# Patient Record
Sex: MALE | Race: BLACK OR AFRICAN AMERICAN | NOT HISPANIC OR LATINO | Employment: FULL TIME | URBAN - METROPOLITAN AREA
[De-identification: names, ages, dates, MRNs, and addresses within clinical notes are randomized per-mention and may not be internally consistent; named-entity substitution may affect disease eponyms.]

---

## 2023-09-08 ENCOUNTER — HOSPITAL ENCOUNTER (OUTPATIENT)
Dept: DATA CONVERSION | Facility: HOSPITAL | Age: 63
Discharge: HOME | End: 2023-09-08
Payer: COMMERCIAL

## 2023-09-08 DIAGNOSIS — E03.9 HYPOTHYROIDISM, UNSPECIFIED: ICD-10-CM

## 2023-09-08 DIAGNOSIS — E78.5 HYPERLIPIDEMIA, UNSPECIFIED: ICD-10-CM

## 2023-09-08 DIAGNOSIS — R73.03 PREDIABETES: ICD-10-CM

## 2023-09-08 DIAGNOSIS — Z00.00 ENCOUNTER FOR GENERAL ADULT MEDICAL EXAMINATION WITHOUT ABNORMAL FINDINGS: ICD-10-CM

## 2023-09-08 DIAGNOSIS — I10 ESSENTIAL (PRIMARY) HYPERTENSION: ICD-10-CM

## 2023-09-08 LAB
ALBUMIN SERPL-MCNC: 3.7 GM/DL (ref 3.5–5)
ALBUMIN/GLOB SERPL: 0.9 RATIO (ref 1.5–3)
ALP BLD-CCNC: 107 U/L (ref 35–125)
ALT SERPL-CCNC: 24 U/L (ref 5–40)
ANION GAP SERPL CALCULATED.3IONS-SCNC: 11 MMOL/L (ref 0–19)
APPEARANCE PLAS: CLEAR
AST SERPL-CCNC: 22 U/L (ref 5–40)
BILIRUB SERPL-MCNC: 0.3 MG/DL (ref 0.1–1.2)
BUN SERPL-MCNC: 13 MG/DL (ref 8–25)
BUN/CREAT SERPL: 13 RATIO (ref 8–21)
CALCIUM SERPL-MCNC: 9.7 MG/DL (ref 8.5–10.4)
CHLORIDE SERPL-SCNC: 106 MMOL/L (ref 97–107)
CHOLEST SERPL-MCNC: 215 MG/DL (ref 133–200)
CHOLEST/HDLC SERPL: 3.4 RATIO
CO2 SERPL-SCNC: 22 MMOL/L (ref 24–31)
COLOR SPUN FLD: YELLOW
CREAT SERPL-MCNC: 1 MG/DL (ref 0.4–1.6)
FASTING STATUS PATIENT QL REPORTED: ABNORMAL
GFR SERPL CREATININE-BSD FRML MDRD: 85 ML/MIN/1.73 M2
GLOBULIN SER-MCNC: 3.9 G/DL (ref 1.9–3.7)
GLUCOSE SERPL-MCNC: 99 MG/DL (ref 65–99)
HBA1C MFR BLD: 5.9 % (ref 4–6)
HDLC SERPL-MCNC: 63 MG/DL
LDLC SERPL CALC-MCNC: 133 MG/DL (ref 65–130)
POTASSIUM SERPL-SCNC: 4.2 MMOL/L (ref 3.4–5.1)
PROT SERPL-MCNC: 7.6 G/DL (ref 5.9–7.9)
SODIUM SERPL-SCNC: 139 MMOL/L (ref 133–145)
TRIGL SERPL-MCNC: 94 MG/DL (ref 40–150)
TSH SERPL DL<=0.05 MIU/L-ACNC: 1.05 MIU/L (ref 0.27–4.2)

## 2023-09-11 PROBLEM — D70.8 OTHER NEUTROPENIA (CMS-HCC): Status: ACTIVE | Noted: 2023-09-11

## 2023-09-11 PROBLEM — E03.9 HYPOTHYROIDISM, ADULT: Status: ACTIVE | Noted: 2023-09-11

## 2023-09-11 PROBLEM — E78.5 HYPERLIPIDEMIA, UNSPECIFIED: Status: ACTIVE | Noted: 2023-09-11

## 2023-09-11 PROBLEM — G56.21 ULNAR NEUROPATHY OF RIGHT UPPER EXTREMITY: Status: ACTIVE | Noted: 2023-09-11

## 2023-09-11 PROBLEM — I10 ESSENTIAL (PRIMARY) HYPERTENSION: Status: ACTIVE | Noted: 2023-09-11

## 2023-09-11 PROBLEM — E55.9 VITAMIN D DEFICIENCY: Status: ACTIVE | Noted: 2023-09-11

## 2023-09-11 RX ORDER — AMLODIPINE BESYLATE 10 MG/1
1 TABLET ORAL DAILY
COMMUNITY
End: 2024-03-22

## 2023-09-11 RX ORDER — LEVOTHYROXINE SODIUM 100 UG/1
1 TABLET ORAL
COMMUNITY
End: 2024-04-12

## 2023-09-11 RX ORDER — CHOLECALCIFEROL (VITAMIN D3) 125 MCG
1 TABLET ORAL DAILY
COMMUNITY
End: 2024-01-17 | Stop reason: WASHOUT

## 2024-01-17 ENCOUNTER — OFFICE VISIT (OUTPATIENT)
Dept: PRIMARY CARE | Facility: CLINIC | Age: 64
End: 2024-01-17
Payer: COMMERCIAL

## 2024-01-17 VITALS
OXYGEN SATURATION: 96 % | TEMPERATURE: 97.2 F | SYSTOLIC BLOOD PRESSURE: 122 MMHG | HEIGHT: 70 IN | WEIGHT: 219.8 LBS | DIASTOLIC BLOOD PRESSURE: 72 MMHG | HEART RATE: 63 BPM | BODY MASS INDEX: 31.47 KG/M2

## 2024-01-17 DIAGNOSIS — M25.512 ACUTE PAIN OF LEFT SHOULDER: Primary | ICD-10-CM

## 2024-01-17 DIAGNOSIS — N52.8 OTHER MALE ERECTILE DYSFUNCTION: ICD-10-CM

## 2024-01-17 DIAGNOSIS — M24.849 LOCKING FINGER JOINT: ICD-10-CM

## 2024-01-17 DIAGNOSIS — E55.9 VITAMIN D DEFICIENCY: ICD-10-CM

## 2024-01-17 PROBLEM — R73.03 PREDIABETES: Status: ACTIVE | Noted: 2024-01-17

## 2024-01-17 PROCEDURE — 3078F DIAST BP <80 MM HG: CPT | Performed by: FAMILY MEDICINE

## 2024-01-17 PROCEDURE — 3074F SYST BP LT 130 MM HG: CPT | Performed by: FAMILY MEDICINE

## 2024-01-17 PROCEDURE — 99214 OFFICE O/P EST MOD 30 MIN: CPT | Performed by: FAMILY MEDICINE

## 2024-01-17 RX ORDER — METHYLPREDNISOLONE 4 MG/1
TABLET ORAL
Qty: 21 TABLET | Refills: 0 | Status: SHIPPED | OUTPATIENT
Start: 2024-01-17 | End: 2024-01-24

## 2024-01-17 RX ORDER — SILDENAFIL 100 MG/1
100 TABLET, FILM COATED ORAL DAILY PRN
Qty: 12 TABLET | Refills: 3 | Status: SHIPPED | OUTPATIENT
Start: 2024-01-17 | End: 2024-04-19 | Stop reason: SDUPTHER

## 2024-01-17 ASSESSMENT — PAIN SCALES - GENERAL: PAINLEVEL: 5

## 2024-01-17 ASSESSMENT — PATIENT HEALTH QUESTIONNAIRE - PHQ9
SUM OF ALL RESPONSES TO PHQ9 QUESTIONS 1 AND 2: 0
1. LITTLE INTEREST OR PLEASURE IN DOING THINGS: NOT AT ALL
2. FEELING DOWN, DEPRESSED OR HOPELESS: NOT AT ALL

## 2024-01-17 NOTE — PATIENT INSTRUCTIONS
Problem List Items Addressed This Visit             ICD-10-CM    Vitamin D deficiency E55.9     - At this time, I will we will plan to defer vitamin D supplementation  -Will plan for check vitamin D level at follow-up appointment         Acute pain of left shoulder - Primary M25.512     - symptoms seem consistent with acute muscle strain  - supportive care advocated in the form of rest, gentle stretching, ice/heat and anti-inflammatory therapy  - will attempt to optimize anti-inflammatory therapy utilizing a structured steroid pack; please take with food as directed  - while on steroid pack, please avoid additional over-the-counter anti-inflammatory such as ibuprofen/Advil/Aleve/Motrin as too much anti-inflammatory medication can irritate stomach  - if additional pain relief is needed, it is safe to take acetaminophen/Tylenol 500-1000 mg every 6 hours as needed with food  - if symptoms persist, please contact office and we can coordinate for further evaluation including possible imaging and/or physical therapy assessment         Relevant Medications    methylPREDNISolone (Medrol Dospak) 4 mg tablets    Locking finger joint M24.849     - Will monitor symptoms and response to steroid pack with plans for specialty evaluation if persists         Relevant Medications    methylPREDNISolone (Medrol Dospak) 4 mg tablets    Other male erectile dysfunction N52.8    Relevant Medications    sildenafil (Viagra) 100 mg tablet         Counseling:       Medication education:         Education:  The patient is counseled regarding potential side-effects of all new medications        Understanding:  Patient expressed understanding        Adherence:  No barriers to adherence identified

## 2024-01-17 NOTE — ASSESSMENT & PLAN NOTE
- At this time, I will we will plan to defer vitamin D supplementation  -Will plan for check vitamin D level at follow-up appointment

## 2024-01-17 NOTE — ASSESSMENT & PLAN NOTE
- Blood pressure stable on current regimen  -Will continue with current medications without modification  -Continue to focus on healthy, balanced diet with moderation of salt/caffeine/alcohol
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
## 2024-01-17 NOTE — ASSESSMENT & PLAN NOTE
- symptoms seem consistent with acute muscle strain  - supportive care advocated in the form of rest, gentle stretching, ice/heat and anti-inflammatory therapy  - will attempt to optimize anti-inflammatory therapy utilizing a structured steroid pack; please take with food as directed  - while on steroid pack, please avoid additional over-the-counter anti-inflammatory such as ibuprofen/Advil/Aleve/Motrin as too much anti-inflammatory medication can irritate stomach  - if additional pain relief is needed, it is safe to take acetaminophen/Tylenol 500-1000 mg every 6 hours as needed with food  - if symptoms persist, please contact office and we can coordinate for further evaluation including possible imaging and/or physical therapy assessment

## 2024-01-17 NOTE — ASSESSMENT & PLAN NOTE
- Will monitor symptoms and response to steroid pack with plans for specialty evaluation if persists

## 2024-03-20 DIAGNOSIS — I10 ESSENTIAL (PRIMARY) HYPERTENSION: ICD-10-CM

## 2024-03-22 RX ORDER — AMLODIPINE BESYLATE 10 MG/1
10 TABLET ORAL DAILY
Qty: 90 TABLET | Refills: 1 | Status: SHIPPED | OUTPATIENT
Start: 2024-03-22 | End: 2024-04-19 | Stop reason: SDUPTHER

## 2024-04-12 DIAGNOSIS — E03.9 HYPOTHYROIDISM, UNSPECIFIED: ICD-10-CM

## 2024-04-12 RX ORDER — LEVOTHYROXINE SODIUM 100 UG/1
TABLET ORAL
Qty: 90 TABLET | Refills: 1 | Status: SHIPPED | OUTPATIENT
Start: 2024-04-12

## 2024-04-19 ENCOUNTER — OFFICE VISIT (OUTPATIENT)
Dept: PRIMARY CARE | Facility: CLINIC | Age: 64
End: 2024-04-19
Payer: COMMERCIAL

## 2024-04-19 VITALS
TEMPERATURE: 96.4 F | HEIGHT: 70 IN | OXYGEN SATURATION: 99 % | BODY MASS INDEX: 30.58 KG/M2 | HEART RATE: 63 BPM | SYSTOLIC BLOOD PRESSURE: 116 MMHG | DIASTOLIC BLOOD PRESSURE: 82 MMHG | WEIGHT: 213.6 LBS

## 2024-04-19 DIAGNOSIS — R73.03 PREDIABETES: ICD-10-CM

## 2024-04-19 DIAGNOSIS — Z11.59 NEED FOR HEPATITIS C SCREENING TEST: ICD-10-CM

## 2024-04-19 DIAGNOSIS — E03.9 HYPOTHYROIDISM, ADULT: ICD-10-CM

## 2024-04-19 DIAGNOSIS — Z12.11 COLON CANCER SCREENING: ICD-10-CM

## 2024-04-19 DIAGNOSIS — Z12.5 PROSTATE CANCER SCREENING: ICD-10-CM

## 2024-04-19 DIAGNOSIS — I10 ESSENTIAL (PRIMARY) HYPERTENSION: Primary | ICD-10-CM

## 2024-04-19 DIAGNOSIS — N52.8 OTHER MALE ERECTILE DYSFUNCTION: ICD-10-CM

## 2024-04-19 DIAGNOSIS — E55.9 VITAMIN D DEFICIENCY: ICD-10-CM

## 2024-04-19 DIAGNOSIS — E78.5 HYPERLIPIDEMIA, UNSPECIFIED HYPERLIPIDEMIA TYPE: ICD-10-CM

## 2024-04-19 PROCEDURE — 3079F DIAST BP 80-89 MM HG: CPT | Performed by: FAMILY MEDICINE

## 2024-04-19 PROCEDURE — 4004F PT TOBACCO SCREEN RCVD TLK: CPT | Performed by: FAMILY MEDICINE

## 2024-04-19 PROCEDURE — 99214 OFFICE O/P EST MOD 30 MIN: CPT | Performed by: FAMILY MEDICINE

## 2024-04-19 PROCEDURE — 3074F SYST BP LT 130 MM HG: CPT | Performed by: FAMILY MEDICINE

## 2024-04-19 RX ORDER — AMLODIPINE BESYLATE 10 MG/1
10 TABLET ORAL DAILY
Qty: 90 TABLET | Refills: 1 | Status: SHIPPED | OUTPATIENT
Start: 2024-04-19 | End: 2024-10-16

## 2024-04-19 RX ORDER — SILDENAFIL 100 MG/1
100 TABLET, FILM COATED ORAL DAILY PRN
Qty: 12 TABLET | Refills: 3 | Status: SHIPPED | OUTPATIENT
Start: 2024-04-19 | End: 2025-04-19

## 2024-04-19 ASSESSMENT — PATIENT HEALTH QUESTIONNAIRE - PHQ9
SUM OF ALL RESPONSES TO PHQ9 QUESTIONS 1 AND 2: 0
2. FEELING DOWN, DEPRESSED OR HOPELESS: NOT AT ALL
1. LITTLE INTEREST OR PLEASURE IN DOING THINGS: NOT AT ALL

## 2024-04-19 ASSESSMENT — PAIN SCALES - GENERAL: PAINLEVEL: 0-NO PAIN
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
## 2024-04-19 NOTE — PATIENT INSTRUCTIONS
Problem List Items Addressed This Visit             ICD-10-CM    Essential (primary) hypertension - Primary I10     - Blood pressure stable on current regimen  -Will continue with current medications without modification  -Continue to focus on healthy, balanced diet with moderation of salt/caffeine/alcohol         Relevant Medications    amLODIPine (Norvasc) 10 mg tablet    Other Relevant Orders    TSH with reflex to Free T4 if abnormal    Lipid Panel    Comprehensive Metabolic Panel    CBC    Hyperlipidemia, unspecified E78.5     - Will check cholesterol levels with blood work ordered today  -Continue to focus on healthy, low-fat diet with moderation of carbohydrates         Relevant Orders    TSH with reflex to Free T4 if abnormal    Lipid Panel    Comprehensive Metabolic Panel    Hypothyroidism, adult E03.9     - Thyroid level stable on most recent blood work completed in September to which we will monitor with blood work ordered today  -Continue on current levothyroxine regimen barring any warranted changes from review of upcoming blood work         Relevant Orders    TSH with reflex to Free T4 if abnormal    Lipid Panel    Comprehensive Metabolic Panel    Vitamin D deficiency E55.9     - Will monitor vitamin D level with blood work ordered today secondary to prior deficiency         Relevant Orders    Vitamin D 25-Hydroxy,Total (for eval of Vitamin D levels)    Prediabetes R73.03     - Will monitor A1c sugar average with blood work ordered today  -Continue to focus on healthy, low-fat diet with moderation of carbohydrates         Relevant Orders    TSH with reflex to Free T4 if abnormal    Lipid Panel    Comprehensive Metabolic Panel    Hemoglobin A1c    Other male erectile dysfunction N52.8    Relevant Medications    sildenafil (Viagra) 100 mg tablet     Other Visit Diagnoses         Codes    Prostate cancer screening     Z12.5    Relevant Orders    Prostate Spec.Ag,Screen    Need for hepatitis C screening  test     Z11.59    Relevant Orders    Hepatitis C antibody    Colon cancer screening     Z12.11            Counseling:       Medication education:         Education:  The patient is counseled regarding potential side-effects of all new medications        Understanding:  Patient expressed understanding        Adherence:  No barriers to adherence identified

## 2024-04-19 NOTE — ASSESSMENT & PLAN NOTE
- Thyroid level stable on most recent blood work completed in September to which we will monitor with blood work ordered today  -Continue on current levothyroxine regimen barring any warranted changes from review of upcoming blood work

## 2024-04-19 NOTE — ASSESSMENT & PLAN NOTE
- Will monitor A1c sugar average with blood work ordered today  -Continue to focus on healthy, low-fat diet with moderation of carbohydrates

## 2024-04-26 ENCOUNTER — LAB (OUTPATIENT)
Dept: LAB | Facility: LAB | Age: 64
End: 2024-04-26
Payer: COMMERCIAL

## 2024-04-26 DIAGNOSIS — Z11.59 NEED FOR HEPATITIS C SCREENING TEST: ICD-10-CM

## 2024-04-26 DIAGNOSIS — E55.9 VITAMIN D DEFICIENCY: ICD-10-CM

## 2024-04-26 DIAGNOSIS — R73.03 PREDIABETES: ICD-10-CM

## 2024-04-26 DIAGNOSIS — I10 ESSENTIAL (PRIMARY) HYPERTENSION: ICD-10-CM

## 2024-04-26 DIAGNOSIS — E03.9 HYPOTHYROIDISM, ADULT: ICD-10-CM

## 2024-04-26 DIAGNOSIS — E78.5 HYPERLIPIDEMIA, UNSPECIFIED HYPERLIPIDEMIA TYPE: ICD-10-CM

## 2024-04-26 DIAGNOSIS — Z12.5 PROSTATE CANCER SCREENING: ICD-10-CM

## 2024-04-26 LAB
25(OH)D3 SERPL-MCNC: 34 NG/ML (ref 31–100)
ALBUMIN SERPL-MCNC: 3.8 G/DL (ref 3.5–5)
ALP BLD-CCNC: 117 U/L (ref 35–125)
ALT SERPL-CCNC: 19 U/L (ref 5–40)
ANION GAP SERPL CALC-SCNC: 11 MMOL/L
AST SERPL-CCNC: 18 U/L (ref 5–40)
BILIRUB SERPL-MCNC: 0.2 MG/DL (ref 0.1–1.2)
BUN SERPL-MCNC: 12 MG/DL (ref 8–25)
CALCIUM SERPL-MCNC: 9.6 MG/DL (ref 8.5–10.4)
CHLORIDE SERPL-SCNC: 108 MMOL/L (ref 97–107)
CHOLEST SERPL-MCNC: 189 MG/DL (ref 133–200)
CHOLEST/HDLC SERPL: 3.9 {RATIO}
CO2 SERPL-SCNC: 23 MMOL/L (ref 24–31)
CREAT SERPL-MCNC: 1.1 MG/DL (ref 0.4–1.6)
EGFRCR SERPLBLD CKD-EPI 2021: 75 ML/MIN/1.73M*2
ERYTHROCYTE [DISTWIDTH] IN BLOOD BY AUTOMATED COUNT: 12.9 % (ref 11.5–14.5)
EST. AVERAGE GLUCOSE BLD GHB EST-MCNC: 117 MG/DL
GLUCOSE SERPL-MCNC: 99 MG/DL (ref 65–99)
HBA1C MFR BLD: 5.7 %
HCT VFR BLD AUTO: 41.3 % (ref 41–52)
HCV AB SER QL: NONREACTIVE
HDLC SERPL-MCNC: 48 MG/DL
HGB BLD-MCNC: 13.5 G/DL (ref 13.5–17.5)
LDLC SERPL CALC-MCNC: 126 MG/DL (ref 65–130)
MCH RBC QN AUTO: 32.6 PG (ref 26–34)
MCHC RBC AUTO-ENTMCNC: 32.7 G/DL (ref 32–36)
MCV RBC AUTO: 100 FL (ref 80–100)
NRBC BLD-RTO: 0 /100 WBCS (ref 0–0)
PLATELET # BLD AUTO: 209 X10*3/UL (ref 150–450)
POTASSIUM SERPL-SCNC: 4.3 MMOL/L (ref 3.4–5.1)
PROT SERPL-MCNC: 7.4 G/DL (ref 5.9–7.9)
PSA SERPL-MCNC: 0.7 NG/ML
RBC # BLD AUTO: 4.14 X10*6/UL (ref 4.5–5.9)
SODIUM SERPL-SCNC: 142 MMOL/L (ref 133–145)
TRIGL SERPL-MCNC: 77 MG/DL (ref 40–150)
TSH SERPL DL<=0.05 MIU/L-ACNC: 1.2 MIU/L (ref 0.27–4.2)
WBC # BLD AUTO: 5.5 X10*3/UL (ref 4.4–11.3)

## 2024-04-26 PROCEDURE — 80061 LIPID PANEL: CPT

## 2024-04-26 PROCEDURE — 82306 VITAMIN D 25 HYDROXY: CPT

## 2024-04-26 PROCEDURE — 85027 COMPLETE CBC AUTOMATED: CPT

## 2024-04-26 PROCEDURE — 80053 COMPREHEN METABOLIC PANEL: CPT

## 2024-04-26 PROCEDURE — 36415 COLL VENOUS BLD VENIPUNCTURE: CPT

## 2024-04-26 PROCEDURE — 84443 ASSAY THYROID STIM HORMONE: CPT

## 2024-04-26 PROCEDURE — 83036 HEMOGLOBIN GLYCOSYLATED A1C: CPT

## 2024-04-26 PROCEDURE — 86803 HEPATITIS C AB TEST: CPT

## 2024-04-26 PROCEDURE — 84153 ASSAY OF PSA TOTAL: CPT

## 2024-06-21 DIAGNOSIS — E55.9 VITAMIN D DEFICIENCY: ICD-10-CM

## 2024-06-24 NOTE — TELEPHONE ENCOUNTER
Rx request received  Pharmacy populated  Last appmt 6 month f/u 4/19/24  Next 6 month f/u 10/16/24

## 2024-06-25 RX ORDER — OMEGA-3S/DHA/EPA/FISH OIL/D3 300MG-1000
2000 CAPSULE ORAL DAILY
Qty: 90 TABLET | Refills: 3 | Status: SHIPPED | OUTPATIENT
Start: 2024-06-25

## 2024-07-01 ENCOUNTER — APPOINTMENT (OUTPATIENT)
Dept: OPHTHALMOLOGY | Facility: CLINIC | Age: 64
End: 2024-07-01
Payer: COMMERCIAL

## 2024-09-25 DIAGNOSIS — E55.9 VITAMIN D DEFICIENCY: Primary | ICD-10-CM

## 2024-09-25 DIAGNOSIS — E03.9 HYPOTHYROIDISM, UNSPECIFIED: ICD-10-CM

## 2024-09-25 RX ORDER — ASPIRIN 325 MG
50000 TABLET, DELAYED RELEASE (ENTERIC COATED) ORAL
Qty: 12 CAPSULE | Refills: 3 | Status: SHIPPED | OUTPATIENT
Start: 2024-09-29

## 2024-09-25 RX ORDER — LEVOTHYROXINE SODIUM 100 UG/1
TABLET ORAL
Qty: 30 TABLET | Refills: 5 | Status: SHIPPED | OUTPATIENT
Start: 2024-09-25

## 2024-10-16 ENCOUNTER — APPOINTMENT (OUTPATIENT)
Dept: PRIMARY CARE | Facility: CLINIC | Age: 64
End: 2024-10-16
Payer: COMMERCIAL

## 2024-10-16 DIAGNOSIS — E55.9 VITAMIN D DEFICIENCY: ICD-10-CM

## 2024-10-16 DIAGNOSIS — E03.9 HYPOTHYROIDISM, ADULT: ICD-10-CM

## 2024-10-16 DIAGNOSIS — R73.03 PREDIABETES: ICD-10-CM

## 2024-10-16 DIAGNOSIS — N52.8 OTHER MALE ERECTILE DYSFUNCTION: ICD-10-CM

## 2024-10-16 DIAGNOSIS — E78.5 HYPERLIPIDEMIA, UNSPECIFIED HYPERLIPIDEMIA TYPE: ICD-10-CM

## 2024-10-16 DIAGNOSIS — I10 ESSENTIAL (PRIMARY) HYPERTENSION: Primary | ICD-10-CM

## 2024-10-16 NOTE — PROGRESS NOTES
Outpatient Visit Note    No chief complaint on file.      HPI:  Rogelio Carrizales is a 64 y.o. male PMH significant for prediabetes, hypertension, hyperlipidemia, vitamin-D deficiency, hypothyroidism, unspecified hyperuricemia, erectile dysfunction and tobacco use who presents to the office for follow-up.  He was last seen in the office on 4/19/2024 for follow-up.    Last panel blood work completed on 4/26/2024 including CBC, CMP, lipid panel, TSH, PSA, A1c, hepatitis C screening and vitamin D.  Blood work was remarkable for prediabetic A1c of 5.7% which had improved from 5.9% in September 2023.    Hypertension:  He continues compliant on regimen of amlodipine 10 mg daily for management of hypertension. He reports to be doing well with no acute complaints. No reported adverse side effects or compliance issues. Denies any chest pain, shortness of breath, lightheadedness, dizziness or vision changes.          Hypothyroidism:  Admits to compliance with adjusted levothyroxine 100 mcg M-Sat regimen. Denies any excessive fatigue, mood or bowel issues on medication. Denies any jitteriness or insomnia.    Pre-diabetes:  Denies any polydipsia, polyphasia or acute vision/sensation changes.    Of note, patient does have history of erectile dysfunction to which he has successfully utilize generic Viagra.  Stated that regimen has been effective.    Preventative Health Maintenance:  In regards to preventative health maintenance, last Tdap received in 2018. Flu shot not typically received. Pneumonia vaccination series previously initiated with [id number] in 2018.  In regards to colon cancer screening, Cologuard successfully completed in April 2023 which was negative. Shingles vaccination series not pursued to date. COVID-19 vaccine series completed with patient currently due for booster.    Current Medications  Current Outpatient Medications   Medication Instructions    amLODIPine (NORVASC) 10 mg, oral, Daily    cholecalciferol  (VITAMIN D-3) 50,000 Units, oral, Once Weekly    levothyroxine (Synthroid, Levoxyl) 100 mcg tablet 1 TABLET IN THE MORNING ON AN EMPTY STOMACH ORALLY ONCE DAILY MONDAY THROUGH SATURDAY 90 DAYS    sildenafil (VIAGRA) 100 mg, oral, Daily PRN    Vitamin D3 2,000 Units, oral, Daily        Allergies  Allergies   Allergen Reactions    Penicillin V Other     10/20/1998;FEVER, 10/20/1998;FEVER        Immunizations  Immunization History   Administered Date(s) Administered    PPD Test 09/05/1995, 09/08/1995    Pfizer Gray Cap SARS-CoV-2 04/01/2022    Pfizer Purple Cap SARS-CoV-2 09/03/2021, 09/24/2021    Pneumococcal conjugate vaccine, 13-valent (PREVNAR 13) 10/11/2018    Td (adult), unspecified 05/24/2003    Td vaccine, age 7 years and older (TENIVAC) 05/24/2003    Tdap vaccine, age 7 year and older (BOOSTRIX, ADACEL) 07/02/2018        Past Medical History:   Diagnosis Date    Disorder of thyroid, unspecified     Thyroid trouble    Hypertension     Hypothyroidism     Personal history of other diseases of the digestive system     History of hemorrhoids      Past Surgical History:   Procedure Laterality Date    OTHER SURGICAL HISTORY  06/30/2022    Hemorrhoidectomy    OTHER SURGICAL HISTORY  06/30/2022    Eye surgery     Family History   Problem Relation Name Age of Onset    Hypertension Mother      Arthritis Father      Hypertension Father      Pancreatic cancer Other Uncle     Colon cancer Other Uncle     Bone cancer Other Uncle      Social History     Tobacco Use    Smoking status: Some Days     Current packs/day: 1.00     Types: Cigarettes    Smokeless tobacco: Never   Vaping Use    Vaping status: Never Used   Substance Use Topics    Alcohol use: Not Currently    Drug use: Never       ROS  All pertinent positive symptoms are included in the history of present illness.  All other systems have been reviewed and are negative and noncontributory to this patient's current ailments.    VITAL SIGNS  There were no vitals filed for  this visit.        PHYSICAL EXAM  GENERAL APPEARANCE: alert and oriented, Pleasant and cooperative, No Acute Distress.   NECK: no lymphadenopathy, no thyromegaly.   HEART: RRR, normal S1S2, no murmurs, click or rubs.   LUNGS: clear to auscultation bilaterally, no wheezes/rhonchi/rales.   EXTREMITIES: Anterior left glenohumeral TTP no edema, grossly intact bilateral upper extremity ROM, negative empty cans test, negative Orosco Hugo  SKIN: normal, no rash, unremarkable.   NEUROLOGIC EXAM: non-focal exam.   MUSCULOSKELETAL: no gross abnormalities.   PSYCH: affect is normal, eye contact is good.     Assessment/Plan   Problem List Items Addressed This Visit             ICD-10-CM    Essential (primary) hypertension - Primary I10     - Blood pressure stable on current regimen  -Will continue with current medications without modification  -Continue to focus on healthy, balanced diet with moderation of salt/caffeine/alcohol         Hyperlipidemia, unspecified E78.5     - Cholesterol level stable on most recent blood work  -Continue to focus on healthy, low-fat diet with moderation of carbohydrates         Hypothyroidism, adult E03.9     - Most recent blood work in April showed stable thyroid level to which we will continue on current levothyroxine dose without modification         Vitamin D deficiency E55.9     - Vitamin D level stable with no active deficiency; will continue on current regimen         Prediabetes R73.03     -Most recent blood work continues to show prediabetes with sugar average improving to 5.7 which is near border of normal sugar levels  -Continue to focus on healthy, balanced diet with moderation of carbohydrates/processed sugars and regular physical activity as tolerated         Other male erectile dysfunction N52.8     - Stable on current regimen              Counseling:       Medication education:         Education:  The patient is counseled regarding potential side-effects of all new  medications        Understanding:  Patient expressed understanding        Adherence:  No barriers to adherence identified

## 2024-10-16 NOTE — ASSESSMENT & PLAN NOTE
- Cholesterol level stable on most recent blood work  -Continue to focus on healthy, low-fat diet with moderation of carbohydrates

## 2024-10-16 NOTE — ASSESSMENT & PLAN NOTE
- Most recent blood work in April showed stable thyroid level to which we will continue on current levothyroxine dose without modification

## 2024-10-16 NOTE — ASSESSMENT & PLAN NOTE
-Most recent blood work continues to show prediabetes with sugar average improving to 5.7 which is near border of normal sugar levels  -Continue to focus on healthy, balanced diet with moderation of carbohydrates/processed sugars and regular physical activity as tolerated

## 2024-10-25 ENCOUNTER — OFFICE VISIT (OUTPATIENT)
Dept: PRIMARY CARE | Facility: CLINIC | Age: 64
End: 2024-10-25
Payer: COMMERCIAL

## 2024-10-25 VITALS
TEMPERATURE: 97 F | WEIGHT: 220 LBS | HEIGHT: 70 IN | DIASTOLIC BLOOD PRESSURE: 82 MMHG | OXYGEN SATURATION: 100 % | BODY MASS INDEX: 31.5 KG/M2 | HEART RATE: 63 BPM | SYSTOLIC BLOOD PRESSURE: 128 MMHG

## 2024-10-25 DIAGNOSIS — R73.03 PREDIABETES: ICD-10-CM

## 2024-10-25 DIAGNOSIS — E78.5 HYPERLIPIDEMIA, UNSPECIFIED HYPERLIPIDEMIA TYPE: ICD-10-CM

## 2024-10-25 DIAGNOSIS — R10.9 INTERMITTENT ABDOMINAL PAIN: ICD-10-CM

## 2024-10-25 DIAGNOSIS — I10 ESSENTIAL (PRIMARY) HYPERTENSION: Primary | ICD-10-CM

## 2024-10-25 DIAGNOSIS — R68.82 DECREASED LIBIDO: ICD-10-CM

## 2024-10-25 DIAGNOSIS — N52.8 OTHER MALE ERECTILE DYSFUNCTION: ICD-10-CM

## 2024-10-25 DIAGNOSIS — E03.9 HYPOTHYROIDISM, ADULT: ICD-10-CM

## 2024-10-25 PROCEDURE — 3008F BODY MASS INDEX DOCD: CPT | Performed by: FAMILY MEDICINE

## 2024-10-25 PROCEDURE — 3079F DIAST BP 80-89 MM HG: CPT | Performed by: FAMILY MEDICINE

## 2024-10-25 PROCEDURE — 3074F SYST BP LT 130 MM HG: CPT | Performed by: FAMILY MEDICINE

## 2024-10-25 PROCEDURE — 4004F PT TOBACCO SCREEN RCVD TLK: CPT | Performed by: FAMILY MEDICINE

## 2024-10-25 PROCEDURE — 99214 OFFICE O/P EST MOD 30 MIN: CPT | Performed by: FAMILY MEDICINE

## 2024-10-25 ASSESSMENT — PATIENT HEALTH QUESTIONNAIRE - PHQ9
1. LITTLE INTEREST OR PLEASURE IN DOING THINGS: NOT AT ALL
2. FEELING DOWN, DEPRESSED OR HOPELESS: NOT AT ALL
SUM OF ALL RESPONSES TO PHQ9 QUESTIONS 1 AND 2: 0

## 2024-10-25 ASSESSMENT — PAIN SCALES - GENERAL: PAINLEVEL_OUTOF10: 0-NO PAIN

## 2024-10-25 NOTE — PROGRESS NOTES
Outpatient Visit Note    Chief Complaint   Patient presents with    Follow-up     6 month f/u       HPI:  Rogelio Carrizales is a 64 y.o. male PMH significant for prediabetes, hypertension, hyperlipidemia, vitamin-D deficiency, hypothyroidism, unspecified hyperuricemia, erectile dysfunction and tobacco use who presents to the office for follow-up.  He was last seen in the office on 4/19/2024 for follow-up.    Last panel blood work completed on 4/26/2024 including CBC, CMP, lipid panel, TSH, PSA, A1c, hepatitis C screening and vitamin D.  Blood work was remarkable for prediabetic A1c of 5.7% which had improved from 5.9% in September 2023.    Hypertension:  He continues compliant on regimen of amlodipine 10 mg daily for management of hypertension. He reports to be doing well with no acute complaints. No reported adverse side effects or compliance issues. Denies any chest pain, shortness of breath, lightheadedness, dizziness or vision changes.          Hypothyroidism:  Admits to compliance with adjusted levothyroxine 100 mcg M-Sat regimen. Denies any excessive fatigue, mood or bowel issues on medication. Denies any jitteriness or insomnia.    Pre-diabetes:  Denies any polydipsia, polyphasia or acute vision/sensation changes.    Of note, patient does have history of erectile dysfunction to which he has successfully utilize generic Viagra.  Stated that regimen has been effective.    Preventative Health Maintenance:  In regards to preventative health maintenance, last Tdap received in 2018. Flu shot not typically received. Pneumonia vaccination series previously initiated with [id number] in 2018.  In regards to colon cancer screening, Cologuard successfully completed in April 2023 which was negative. Shingles vaccination series not pursued to date. COVID-19 vaccine series completed with patient currently due for booster.    Current Medications  Current Outpatient Medications   Medication Instructions    amLODIPine  (NORVASC) 10 mg, oral, Daily    cholecalciferol (VITAMIN D-3) 50,000 Units, oral, Once Weekly    levothyroxine (Synthroid, Levoxyl) 100 mcg tablet 1 TABLET IN THE MORNING ON AN EMPTY STOMACH ORALLY ONCE DAILY MONDAY THROUGH SATURDAY 90 DAYS    sildenafil (VIAGRA) 100 mg, oral, Daily PRN    Vitamin D3 2,000 Units, oral, Daily        Allergies  Allergies   Allergen Reactions    Penicillin V Other     10/20/1998;FEVER, 10/20/1998;FEVER        Immunizations  Immunization History   Administered Date(s) Administered    PPD Test 09/05/1995, 09/08/1995    Pfizer Gray Cap SARS-CoV-2 04/01/2022    Pfizer Purple Cap SARS-CoV-2 09/03/2021, 09/24/2021    Pneumococcal conjugate vaccine, 13-valent (PREVNAR 13) 10/11/2018    Td (adult), unspecified 05/24/2003    Td vaccine, age 7 years and older (TENIVAC) 05/24/2003    Tdap vaccine, age 7 year and older (BOOSTRIX, ADACEL) 07/02/2018        Past Medical History:   Diagnosis Date    Disorder of thyroid, unspecified     Thyroid trouble    Hypertension     Hypothyroidism     Personal history of other diseases of the digestive system     History of hemorrhoids      Past Surgical History:   Procedure Laterality Date    OTHER SURGICAL HISTORY  06/30/2022    Hemorrhoidectomy    OTHER SURGICAL HISTORY  06/30/2022    Eye surgery     Family History   Problem Relation Name Age of Onset    Hypertension Mother      Arthritis Father      Hypertension Father      Pancreatic cancer Other Uncle     Colon cancer Other Uncle     Bone cancer Other Uncle      Social History     Tobacco Use    Smoking status: Some Days     Current packs/day: 0.50     Types: Cigarettes    Smokeless tobacco: Never   Vaping Use    Vaping status: Never Used   Substance Use Topics    Alcohol use: Not Currently    Drug use: Never       ROS  All pertinent positive symptoms are included in the history of present illness.  All other systems have been reviewed and are negative and noncontributory to this patient's current  ailments.    VITAL SIGNS  Vitals:    10/25/24 0935   BP: 128/82   Pulse: 63   Temp: 36.1 °C (97 °F)   SpO2: 100%           PHYSICAL EXAM  GENERAL APPEARANCE: alert and oriented, Pleasant and cooperative, No Acute Distress.   NECK: no lymphadenopathy, no thyromegaly.   HEART: RRR, normal S1S2, no murmurs, click or rubs.   LUNGS: clear to auscultation bilaterally, no wheezes/rhonchi/rales.   EXTREMITIES: Anterior left glenohumeral TTP no edema, grossly intact bilateral upper extremity ROM, negative empty cans test, negative Orosco Hugo  SKIN: normal, no rash, unremarkable.   NEUROLOGIC EXAM: non-focal exam.   MUSCULOSKELETAL: no gross abnormalities.   PSYCH: affect is normal, eye contact is good.     Assessment/Plan   Problem List Items Addressed This Visit             ICD-10-CM    Essential (primary) hypertension - Primary I10     - Blood pressure stable on current regimen  -Will continue with current medications without modification  -Continue to focus on healthy, balanced diet with moderation of salt/caffeine/alcohol         Relevant Orders    Comprehensive metabolic panel    Tsh With Reflex To Free T4 If Abnormal    Hyperlipidemia, unspecified E78.5     - Cholesterol level stable on most recent blood work  -Continue to focus on healthy, low-fat diet with moderation of carbohydrates         Relevant Orders    Comprehensive metabolic panel    Tsh With Reflex To Free T4 If Abnormal    Hypothyroidism, adult E03.9     - Most recent blood work in April showed stable thyroid level to which we will continue on current levothyroxine dose without modification         Relevant Orders    Comprehensive metabolic panel    Tsh With Reflex To Free T4 If Abnormal    Prediabetes R73.03     -Most recent blood work continues to show prediabetes with sugar average improving to 5.7 which is near border of normal sugar levels  -Continue to focus on healthy, balanced diet with moderation of carbohydrates/processed sugars and regular  physical activity as tolerated         Relevant Orders    Hemoglobin A1c    Comprehensive metabolic panel    Other male erectile dysfunction N52.8     - Stable on current regimen         Relevant Orders    Testosterone, total and free    Decreased libido R68.82     - Persistent decreased libido complaints, will plan to check testosterone level with blood work  -At this time I would avoid any over-the-counter products referencing male enhancement/libido improvement as many of these products are not verified in regards to their claims         Relevant Orders    Testosterone, total and free     Other Visit Diagnoses         Codes    Intermittent abdominal pain     R10.9    Relevant Orders    Lipase                Counseling:       Medication education:         Education:  The patient is counseled regarding potential side-effects of all new medications        Understanding:  Patient expressed understanding        Adherence:  No barriers to adherence identified

## 2024-10-25 NOTE — PATIENT INSTRUCTIONS
Problem List Items Addressed This Visit             ICD-10-CM    Essential (primary) hypertension - Primary I10     - Blood pressure stable on current regimen  -Will continue with current medications without modification  -Continue to focus on healthy, balanced diet with moderation of salt/caffeine/alcohol         Relevant Orders    Comprehensive metabolic panel    Tsh With Reflex To Free T4 If Abnormal    Hyperlipidemia, unspecified E78.5     - Cholesterol level stable on most recent blood work  -Continue to focus on healthy, low-fat diet with moderation of carbohydrates         Relevant Orders    Comprehensive metabolic panel    Tsh With Reflex To Free T4 If Abnormal    Hypothyroidism, adult E03.9     - Most recent blood work in April showed stable thyroid level to which we will continue on current levothyroxine dose without modification         Relevant Orders    Comprehensive metabolic panel    Tsh With Reflex To Free T4 If Abnormal    Prediabetes R73.03     -Most recent blood work continues to show prediabetes with sugar average improving to 5.7 which is near border of normal sugar levels  -Continue to focus on healthy, balanced diet with moderation of carbohydrates/processed sugars and regular physical activity as tolerated         Relevant Orders    Hemoglobin A1c    Comprehensive metabolic panel    Other male erectile dysfunction N52.8     - Stable on current regimen         Relevant Orders    Testosterone, total and free    Decreased libido R68.82     - Persistent decreased libido complaints, will plan to check testosterone level with blood work  -At this time I would avoid any over-the-counter products referencing male enhancement/libido improvement as many of these products are not verified in regards to their claims         Relevant Orders    Testosterone, total and free     Other Visit Diagnoses         Codes    Intermittent abdominal pain     R10.9    Relevant Orders    Lipase                 Counseling:       Medication education:         Education:  The patient is counseled regarding potential side-effects of all new medications        Understanding:  Patient expressed understanding        Adherence:  No barriers to adherence identified

## 2024-10-30 ENCOUNTER — LAB (OUTPATIENT)
Dept: LAB | Facility: LAB | Age: 64
End: 2024-10-30
Payer: COMMERCIAL

## 2024-10-30 DIAGNOSIS — E03.9 HYPOTHYROIDISM, ADULT: ICD-10-CM

## 2024-10-30 DIAGNOSIS — R73.03 PREDIABETES: ICD-10-CM

## 2024-10-30 DIAGNOSIS — R10.9 INTERMITTENT ABDOMINAL PAIN: ICD-10-CM

## 2024-10-30 DIAGNOSIS — I10 ESSENTIAL (PRIMARY) HYPERTENSION: ICD-10-CM

## 2024-10-30 DIAGNOSIS — E78.5 HYPERLIPIDEMIA, UNSPECIFIED HYPERLIPIDEMIA TYPE: ICD-10-CM

## 2024-10-30 DIAGNOSIS — N52.8 OTHER MALE ERECTILE DYSFUNCTION: ICD-10-CM

## 2024-10-30 DIAGNOSIS — R68.82 DECREASED LIBIDO: ICD-10-CM

## 2024-10-30 LAB
ALBUMIN SERPL BCP-MCNC: 3.7 G/DL (ref 3.4–5)
ALP SERPL-CCNC: 71 U/L (ref 33–136)
ALT SERPL W P-5'-P-CCNC: 25 U/L (ref 10–52)
ANION GAP SERPL CALCULATED.3IONS-SCNC: 9 MMOL/L (ref 10–20)
AST SERPL W P-5'-P-CCNC: 32 U/L (ref 9–39)
BILIRUB SERPL-MCNC: 0.5 MG/DL (ref 0–1.2)
BUN SERPL-MCNC: 14 MG/DL (ref 6–23)
CALCIUM SERPL-MCNC: 9.6 MG/DL (ref 8.6–10.3)
CHLORIDE SERPL-SCNC: 107 MMOL/L (ref 98–107)
CO2 SERPL-SCNC: 27 MMOL/L (ref 21–32)
CREAT SERPL-MCNC: 1.14 MG/DL (ref 0.5–1.3)
EGFRCR SERPLBLD CKD-EPI 2021: 72 ML/MIN/1.73M*2
EST. AVERAGE GLUCOSE BLD GHB EST-MCNC: 123 MG/DL
GLUCOSE SERPL-MCNC: 88 MG/DL (ref 74–99)
HBA1C MFR BLD: 5.9 %
LIPASE SERPL-CCNC: 38 U/L (ref 9–82)
POTASSIUM SERPL-SCNC: 4.1 MMOL/L (ref 3.5–5.3)
PROT SERPL-MCNC: 6.9 G/DL (ref 6.4–8.2)
SODIUM SERPL-SCNC: 139 MMOL/L (ref 136–145)
T4 FREE SERPL-MCNC: 0.28 NG/DL (ref 0.61–1.12)
TSH SERPL-ACNC: 32.04 MIU/L (ref 0.44–3.98)

## 2024-10-30 PROCEDURE — 84439 ASSAY OF FREE THYROXINE: CPT

## 2024-10-30 PROCEDURE — 84402 ASSAY OF FREE TESTOSTERONE: CPT

## 2024-10-30 PROCEDURE — 80053 COMPREHEN METABOLIC PANEL: CPT

## 2024-10-30 PROCEDURE — 36415 COLL VENOUS BLD VENIPUNCTURE: CPT

## 2024-10-30 PROCEDURE — 83036 HEMOGLOBIN GLYCOSYLATED A1C: CPT

## 2024-10-30 PROCEDURE — 84443 ASSAY THYROID STIM HORMONE: CPT

## 2024-10-30 PROCEDURE — 83690 ASSAY OF LIPASE: CPT

## 2024-11-01 ENCOUNTER — TELEPHONE (OUTPATIENT)
Dept: PRIMARY CARE | Facility: CLINIC | Age: 64
End: 2024-11-01
Payer: COMMERCIAL

## 2024-11-03 DIAGNOSIS — E34.9 TESTOSTERONE DEFICIENCY: Primary | ICD-10-CM

## 2024-11-03 LAB
TESTOSTERONE FREE (CHAN): 29.4 PG/ML (ref 35–155)
TESTOSTERONE,TOTAL,LC-MS/MS: 154 NG/DL (ref 250–1100)

## 2025-02-18 ENCOUNTER — TELEPHONE (OUTPATIENT)
Dept: OPHTHALMOLOGY | Facility: CLINIC | Age: 65
End: 2025-02-18
Payer: COMMERCIAL

## 2025-02-20 ENCOUNTER — OFFICE VISIT (OUTPATIENT)
Dept: OPHTHALMOLOGY | Facility: CLINIC | Age: 65
End: 2025-02-20
Payer: COMMERCIAL

## 2025-02-20 DIAGNOSIS — H02.889 MGD (MEIBOMIAN GLAND DYSFUNCTION): ICD-10-CM

## 2025-02-20 DIAGNOSIS — H53.10 SUBJECTIVE VISION DISTURBANCE: ICD-10-CM

## 2025-02-20 DIAGNOSIS — H25.813 COMBINED FORMS OF AGE-RELATED CATARACT OF BOTH EYES: Primary | ICD-10-CM

## 2025-02-20 DIAGNOSIS — H16.142 SUPERFICIAL PUNCTATE KERATITIS OF LEFT EYE: ICD-10-CM

## 2025-02-20 PROCEDURE — 99214 OFFICE O/P EST MOD 30 MIN: CPT | Performed by: OPHTHALMOLOGY

## 2025-02-20 RX ORDER — CLINDAMYCIN HYDROCHLORIDE 300 MG/1
CAPSULE ORAL
COMMUNITY
Start: 2025-02-19

## 2025-02-20 ASSESSMENT — ENCOUNTER SYMPTOMS
CARDIOVASCULAR NEGATIVE: 0
PSYCHIATRIC NEGATIVE: 0
CONSTITUTIONAL NEGATIVE: 0
RESPIRATORY NEGATIVE: 0
NEUROLOGICAL NEGATIVE: 0
GASTROINTESTINAL NEGATIVE: 0
EYES NEGATIVE: 0
HEMATOLOGIC/LYMPHATIC NEGATIVE: 0
MUSCULOSKELETAL NEGATIVE: 0
ALLERGIC/IMMUNOLOGIC NEGATIVE: 0
ENDOCRINE NEGATIVE: 0

## 2025-02-20 ASSESSMENT — EXTERNAL EXAM - LEFT EYE: OS_EXAM: NORMAL

## 2025-02-20 ASSESSMENT — REFRACTION_WEARINGRX
OD_CYLINDER: -0.75
OS_CYLINDER: -1.50
OD_SPHERE: -1.00
SPECS_TYPE: SVL
OD_AXIS: 012
OS_SPHERE: -1.00
OS_AXIS: 058

## 2025-02-20 ASSESSMENT — VISUAL ACUITY
OS_CC: 20/20
OD_CC: 20/20
OS_CC+: -1
METHOD: SNELLEN - LINEAR

## 2025-02-20 ASSESSMENT — SLIT LAMP EXAM - LIDS: COMMENTS: MEIBOMIAN GLAND DYSFUNCTION

## 2025-02-20 ASSESSMENT — TONOMETRY
OS_IOP_MMHG: 11
IOP_METHOD: GOLDMANN APPLANATION
OD_IOP_MMHG: 10

## 2025-02-20 ASSESSMENT — PATIENT HEALTH QUESTIONNAIRE - PHQ9
2. FEELING DOWN, DEPRESSED OR HOPELESS: NOT AT ALL
1. LITTLE INTEREST OR PLEASURE IN DOING THINGS: NOT AT ALL
SUM OF ALL RESPONSES TO PHQ9 QUESTIONS 1 AND 2: 0

## 2025-02-20 ASSESSMENT — PAIN SCALES - GENERAL: PAINLEVEL_OUTOF10: 0-NO PAIN

## 2025-02-20 ASSESSMENT — CUP TO DISC RATIO
OD_RATIO: 0.35
OS_RATIO: 0.35

## 2025-02-20 ASSESSMENT — EXTERNAL EXAM - RIGHT EYE: OD_EXAM: NORMAL

## 2025-02-20 NOTE — PROGRESS NOTES
Assessment/Plan   Problem List Items Addressed This Visit       Combined forms of age-related cataract of both eyes - Primary     Suspect significant left eye (OS)>right eye (OD). Advised will bring in for formal refraction and glare testing in near future         Subjective vision disturbance     Unclear on true progression as similar symptoms in past. Unsure if has worsened objectively. Advised no etiology on exam, may be on PVD spectrum but length of time seems atypical. Educated on signs and symptoms of retinal detachment (RD) and will call if any new issues.          MGD (meibomian gland dysfunction)     Some role in surface issues, advised on role of heat to help with MG function.          Superficial punctate keratitis of left eye     Suspect left eye (OS) only as previous punctal surgery right eye (OD) acting as punctal occlusion. Discussed regular lubrication left eye (OS) for improved comfort and ocular surface health.             Provided reassurance regarding above diagnoses and care received in the office visit today. Discussed outcomes and options along with the importance of treatment compliance. Understands the importance of any follow up visits. Patient instructed to call/communicate with our office if any new issues, questions, or concerns.     Will plan to see back in 1 month for refraction and glare or sooner PRN

## 2025-02-20 NOTE — ASSESSMENT & PLAN NOTE
Suspect significant left eye (OS)>right eye (OD). Advised will bring in for formal refraction and glare testing in near future

## 2025-02-20 NOTE — PATIENT INSTRUCTIONS
Thank you so much for choosing me to provide your care today!    If you were dilated your vision may remain blurry   or light sensitive for several hours.    The nature of eye and vision problems can require frequent follow up, please make every effort to adhere to any future appointments.    If you have any issues, questions, or concerns,   please do not hesitate to reach out.    If you receive a survey in regards to your care today, please mention any exceptional care my office staff and/or technicians provided.    You can reach our office at this number:    [phone]    Please consider signing up for and utilizing [employer]!  This is the best way to directly reach me or other  providers    Artificial tears 3-4 times daily  Heat to eyelids a few times weekly

## 2025-02-20 NOTE — ASSESSMENT & PLAN NOTE
Suspect left eye (OS) only as previous punctal surgery right eye (OD) acting as punctal occlusion. Discussed regular lubrication left eye (OS) for improved comfort and ocular surface health.

## 2025-02-20 NOTE — ASSESSMENT & PLAN NOTE
Unclear on true progression as similar symptoms in past. Unsure if has worsened objectively. Advised no etiology on exam, may be on PVD spectrum but length of time seems atypical. Educated on signs and symptoms of retinal detachment (RD) and will call if any new issues.

## 2025-03-05 ENCOUNTER — APPOINTMENT (OUTPATIENT)
Dept: OPHTHALMOLOGY | Facility: CLINIC | Age: 65
End: 2025-03-05
Payer: COMMERCIAL

## 2025-04-02 ENCOUNTER — APPOINTMENT (OUTPATIENT)
Dept: OPHTHALMOLOGY | Facility: CLINIC | Age: 65
End: 2025-04-02
Payer: COMMERCIAL

## 2025-04-29 DIAGNOSIS — I10 ESSENTIAL (PRIMARY) HYPERTENSION: ICD-10-CM

## 2025-04-29 DIAGNOSIS — E03.9 HYPOTHYROIDISM, UNSPECIFIED: ICD-10-CM

## 2025-04-29 DIAGNOSIS — E55.9 VITAMIN D DEFICIENCY: ICD-10-CM

## 2025-04-29 RX ORDER — LEVOTHYROXINE SODIUM 100 UG/1
100 TABLET ORAL DAILY
Qty: 30 TABLET | Refills: 5 | Status: SHIPPED | OUTPATIENT
Start: 2025-04-29 | End: 2025-05-29

## 2025-04-29 RX ORDER — CHOLECALCIFEROL (VITAMIN D3) 50 MCG
50 TABLET ORAL DAILY
Qty: 30 TABLET | Refills: 0 | Status: SHIPPED | OUTPATIENT
Start: 2025-04-29

## 2025-04-29 RX ORDER — AMLODIPINE BESYLATE 10 MG/1
10 TABLET ORAL DAILY
Qty: 30 TABLET | Refills: 0 | Status: SHIPPED | OUTPATIENT
Start: 2025-04-29